# Patient Record
Sex: MALE | Race: OTHER | HISPANIC OR LATINO
[De-identification: names, ages, dates, MRNs, and addresses within clinical notes are randomized per-mention and may not be internally consistent; named-entity substitution may affect disease eponyms.]

---

## 2023-06-15 PROBLEM — Z00.00 ENCOUNTER FOR PREVENTIVE HEALTH EXAMINATION: Status: ACTIVE | Noted: 2023-06-15

## 2023-06-20 ENCOUNTER — APPOINTMENT (OUTPATIENT)
Dept: ORTHOPEDIC SURGERY | Facility: CLINIC | Age: 33
End: 2023-06-20
Payer: COMMERCIAL

## 2023-06-20 PROCEDURE — 73140 X-RAY EXAM OF FINGER(S): CPT | Mod: LT

## 2023-06-20 PROCEDURE — 99203 OFFICE O/P NEW LOW 30 MIN: CPT

## 2023-06-20 NOTE — HISTORY OF PRESENT ILLNESS
[de-identified] : Patient presents today for evaluation of Left ring finger. Problem started about 1 month ago. 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 Patient states since injury swelling has reduced but he continues to have pain and deformity in the finger.

## 2023-06-20 NOTE — DISCUSSION/SUMMARY
[de-identified] : reviewed findings, anatomy and pathology  discussed and pt understands. questions answered, pt left pleased\par matthew tape 3-4 finger

## 2023-06-20 NOTE — PHYSICAL EXAM
[4th] : 4th [PIP Joint] : PIP joint [Middle Phalanx] : middle phalanx [NL (75)] : Dorsiflexion 75 degrees [NL (85)] : volarflexion 85 degrees [NL (25)] : radial deviation 25 degrees [NL (40)] : ulnar deviation 40 degrees [5___] : pinch 5[unfilled]/5 [Left] : left fingers [There are no fractures, subluxations or dislocations. No significant abnormalities are seen] : There are no fractures, subluxations or dislocations. No significant abnormalities are seen [] : no erythema [de-identified] : co lateral lig [de-identified] : discomfort refer to 4th finger [FreeTextEntry9] : 4th finger PIP joint

## 2023-08-04 ENCOUNTER — APPOINTMENT (OUTPATIENT)
Dept: ORTHOPEDIC SURGERY | Facility: CLINIC | Age: 33
End: 2023-08-04
Payer: COMMERCIAL

## 2023-08-04 DIAGNOSIS — S69.92XA UNSPECIFIED INJURY OF LEFT WRIST, HAND AND FINGER(S), INITIAL ENCOUNTER: ICD-10-CM

## 2023-08-04 PROCEDURE — 99213 OFFICE O/P EST LOW 20 MIN: CPT

## 2023-08-04 NOTE — DISCUSSION/SUMMARY
[de-identified] : I discussed his condition and treatment course.  Discontinue matthew tape.  Resume activities as tolerated.  Patient voiced understanding and agreement with the plan.

## 2023-08-04 NOTE — PHYSICAL EXAM
[NL (75)] : Dorsiflexion 75 degrees [NL (85)] : volarflexion 85 degrees [NL (25)] : radial deviation 25 degrees [NL (40)] : ulnar deviation 40 degrees [5___] : pinch 5[unfilled]/5 [Left] : left fingers [There are no fractures, subluxations or dislocations. No significant abnormalities are seen] : There are no fractures, subluxations or dislocations. No significant abnormalities are seen [FreeTextEntry9] : 4th finger PIP joint [] : good capillary refill in all fingers [de-identified] : co lateral lig [de-identified] : discomfort refer to 4th finger

## 2023-08-04 NOTE — HISTORY OF PRESENT ILLNESS
[de-identified] : Patient of Dr Luna. Patient states finger is doing well pain every now and then when he carries something heavy.  He has been matthew taping as recommended.